# Patient Record
Sex: FEMALE | Race: WHITE | Employment: OTHER
[De-identification: names, ages, dates, MRNs, and addresses within clinical notes are randomized per-mention and may not be internally consistent; named-entity substitution may affect disease eponyms.]

---

## 2017-04-03 PROBLEM — R06.02 SOB (SHORTNESS OF BREATH) ON EXERTION: Status: ACTIVE | Noted: 2017-04-03

## 2017-10-16 PROBLEM — R19.7 DIARRHEA: Status: ACTIVE | Noted: 2017-10-16

## 2018-04-16 PROBLEM — R19.7 DIARRHEA: Status: RESOLVED | Noted: 2017-10-16 | Resolved: 2018-04-16

## 2018-04-16 PROBLEM — K59.1 FUNCTIONAL DIARRHEA: Status: ACTIVE | Noted: 2018-04-16

## 2018-06-12 PROBLEM — K57.30 DIVERTICULOSIS OF LARGE INTESTINE WITHOUT HEMORRHAGE: Status: ACTIVE | Noted: 2018-06-12

## 2019-07-22 PROBLEM — R31.9 HEMATURIA: Status: ACTIVE | Noted: 2019-07-22

## 2019-10-29 PROBLEM — T45.1X5A NEUROPATHY DUE TO CHEMOTHERAPEUTIC DRUG (HCC): Status: ACTIVE | Noted: 2019-10-29

## 2019-10-29 PROBLEM — G62.0 NEUROPATHY DUE TO CHEMOTHERAPEUTIC DRUG (HCC): Status: ACTIVE | Noted: 2019-10-29

## 2019-10-29 PROBLEM — Z86.73 HISTORY OF CVA (CEREBROVASCULAR ACCIDENT): Status: ACTIVE | Noted: 2019-10-29

## 2020-01-01 ENCOUNTER — HOME CARE VISIT (OUTPATIENT)
Dept: SCHEDULING | Facility: HOME HEALTH | Age: 80
End: 2020-01-01
Payer: MEDICARE

## 2020-01-01 ENCOUNTER — HOME CARE VISIT (OUTPATIENT)
Dept: HOSPICE | Facility: HOSPICE | Age: 80
End: 2020-01-01
Payer: MEDICARE

## 2020-01-01 ENCOUNTER — HOSPICE ADMISSION (OUTPATIENT)
Dept: HOSPICE | Facility: HOSPICE | Age: 80
End: 2020-01-01
Payer: MEDICARE

## 2020-01-01 ENCOUNTER — HOSPITAL ENCOUNTER (INPATIENT)
Age: 80
LOS: 2 days | End: 2020-12-14
Attending: INTERNAL MEDICINE | Admitting: INTERNAL MEDICINE
Payer: MEDICARE

## 2020-01-01 VITALS — HEART RATE: 80 BPM | RESPIRATION RATE: 20 BRPM | DIASTOLIC BLOOD PRESSURE: 100 MMHG | SYSTOLIC BLOOD PRESSURE: 150 MMHG

## 2020-01-01 VITALS
TEMPERATURE: 96.7 F | RESPIRATION RATE: 16 BRPM | HEART RATE: 84 BPM | SYSTOLIC BLOOD PRESSURE: 145 MMHG | DIASTOLIC BLOOD PRESSURE: 80 MMHG

## 2020-01-01 VITALS
DIASTOLIC BLOOD PRESSURE: 79 MMHG | TEMPERATURE: 97.7 F | RESPIRATION RATE: 16 BRPM | HEART RATE: 106 BPM | SYSTOLIC BLOOD PRESSURE: 101 MMHG

## 2020-01-01 VITALS
RESPIRATION RATE: 16 BRPM | TEMPERATURE: 98.3 F | HEART RATE: 80 BPM | DIASTOLIC BLOOD PRESSURE: 80 MMHG | SYSTOLIC BLOOD PRESSURE: 152 MMHG

## 2020-01-01 VITALS
TEMPERATURE: 98 F | HEART RATE: 110 BPM | RESPIRATION RATE: 16 BRPM | SYSTOLIC BLOOD PRESSURE: 152 MMHG | DIASTOLIC BLOOD PRESSURE: 79 MMHG

## 2020-01-01 VITALS
DIASTOLIC BLOOD PRESSURE: 61 MMHG | RESPIRATION RATE: 16 BRPM | HEART RATE: 81 BPM | SYSTOLIC BLOOD PRESSURE: 122 MMHG | TEMPERATURE: 98 F

## 2020-01-01 VITALS
HEART RATE: 84 BPM | RESPIRATION RATE: 16 BRPM | DIASTOLIC BLOOD PRESSURE: 75 MMHG | TEMPERATURE: 96.1 F | SYSTOLIC BLOOD PRESSURE: 124 MMHG

## 2020-01-01 VITALS
DIASTOLIC BLOOD PRESSURE: 78 MMHG | RESPIRATION RATE: 16 BRPM | SYSTOLIC BLOOD PRESSURE: 144 MMHG | TEMPERATURE: 97.5 F | HEART RATE: 84 BPM

## 2020-01-01 VITALS
TEMPERATURE: 97.8 F | RESPIRATION RATE: 16 BRPM | SYSTOLIC BLOOD PRESSURE: 150 MMHG | DIASTOLIC BLOOD PRESSURE: 80 MMHG | HEART RATE: 88 BPM

## 2020-01-01 VITALS
TEMPERATURE: 98.2 F | HEIGHT: 70 IN | HEART RATE: 105 BPM | WEIGHT: 170 LBS | SYSTOLIC BLOOD PRESSURE: 165 MMHG | RESPIRATION RATE: 18 BRPM | BODY MASS INDEX: 24.34 KG/M2 | OXYGEN SATURATION: 95 % | DIASTOLIC BLOOD PRESSURE: 90 MMHG

## 2020-01-01 VITALS
RESPIRATION RATE: 16 BRPM | SYSTOLIC BLOOD PRESSURE: 150 MMHG | DIASTOLIC BLOOD PRESSURE: 77 MMHG | HEART RATE: 82 BPM | TEMPERATURE: 98 F

## 2020-01-01 VITALS
TEMPERATURE: 95 F | DIASTOLIC BLOOD PRESSURE: 69 MMHG | RESPIRATION RATE: 16 BRPM | HEART RATE: 76 BPM | SYSTOLIC BLOOD PRESSURE: 129 MMHG

## 2020-01-01 VITALS — SYSTOLIC BLOOD PRESSURE: 149 MMHG | HEART RATE: 80 BPM | RESPIRATION RATE: 16 BRPM | DIASTOLIC BLOOD PRESSURE: 78 MMHG

## 2020-01-01 VITALS
RESPIRATION RATE: 18 BRPM | DIASTOLIC BLOOD PRESSURE: 90 MMHG | HEART RATE: 88 BPM | SYSTOLIC BLOOD PRESSURE: 150 MMHG | TEMPERATURE: 97.2 F

## 2020-01-01 VITALS
DIASTOLIC BLOOD PRESSURE: 80 MMHG | HEART RATE: 84 BPM | SYSTOLIC BLOOD PRESSURE: 130 MMHG | RESPIRATION RATE: 16 BRPM | TEMPERATURE: 97.7 F

## 2020-01-01 VITALS
TEMPERATURE: 98.1 F | SYSTOLIC BLOOD PRESSURE: 140 MMHG | HEART RATE: 92 BPM | DIASTOLIC BLOOD PRESSURE: 70 MMHG | RESPIRATION RATE: 16 BRPM

## 2020-01-01 VITALS
RESPIRATION RATE: 16 BRPM | SYSTOLIC BLOOD PRESSURE: 140 MMHG | TEMPERATURE: 98.2 F | HEART RATE: 88 BPM | DIASTOLIC BLOOD PRESSURE: 70 MMHG

## 2020-01-01 VITALS
TEMPERATURE: 97.5 F | SYSTOLIC BLOOD PRESSURE: 145 MMHG | DIASTOLIC BLOOD PRESSURE: 80 MMHG | HEART RATE: 88 BPM | RESPIRATION RATE: 18 BRPM

## 2020-01-01 VITALS
TEMPERATURE: 98.1 F | SYSTOLIC BLOOD PRESSURE: 98 MMHG | HEART RATE: 115 BPM | DIASTOLIC BLOOD PRESSURE: 65 MMHG | RESPIRATION RATE: 18 BRPM

## 2020-01-01 VITALS
SYSTOLIC BLOOD PRESSURE: 128 MMHG | RESPIRATION RATE: 16 BRPM | HEART RATE: 118 BPM | DIASTOLIC BLOOD PRESSURE: 73 MMHG | TEMPERATURE: 99.2 F

## 2020-01-01 VITALS
RESPIRATION RATE: 16 BRPM | TEMPERATURE: 98.1 F | HEART RATE: 89 BPM | DIASTOLIC BLOOD PRESSURE: 90 MMHG | SYSTOLIC BLOOD PRESSURE: 146 MMHG

## 2020-01-01 VITALS
DIASTOLIC BLOOD PRESSURE: 80 MMHG | TEMPERATURE: 97.8 F | HEART RATE: 88 BPM | SYSTOLIC BLOOD PRESSURE: 140 MMHG | RESPIRATION RATE: 18 BRPM

## 2020-01-01 VITALS
TEMPERATURE: 97.9 F | HEART RATE: 110 BPM | RESPIRATION RATE: 18 BRPM | SYSTOLIC BLOOD PRESSURE: 104 MMHG | DIASTOLIC BLOOD PRESSURE: 62 MMHG

## 2020-01-01 VITALS — SYSTOLIC BLOOD PRESSURE: 140 MMHG | DIASTOLIC BLOOD PRESSURE: 90 MMHG | RESPIRATION RATE: 20 BRPM | HEART RATE: 80 BPM

## 2020-01-01 VITALS
SYSTOLIC BLOOD PRESSURE: 110 MMHG | TEMPERATURE: 97.5 F | DIASTOLIC BLOOD PRESSURE: 70 MMHG | RESPIRATION RATE: 12 BRPM | HEART RATE: 84 BPM

## 2020-01-01 VITALS
HEART RATE: 90 BPM | TEMPERATURE: 98.2 F | SYSTOLIC BLOOD PRESSURE: 139 MMHG | RESPIRATION RATE: 18 BRPM | DIASTOLIC BLOOD PRESSURE: 87 MMHG

## 2020-01-01 PROCEDURE — 0651 HSPC ROUTINE HOME CARE

## 2020-01-01 PROCEDURE — A6250 SKIN SEAL PROTECT MOISTURIZR: HCPCS

## 2020-01-01 PROCEDURE — G0299 HHS/HOSPICE OF RN EA 15 MIN: HCPCS

## 2020-01-01 PROCEDURE — G0156 HHCP-SVS OF AIDE,EA 15 MIN: HCPCS

## 2020-01-01 PROCEDURE — HOSPICE MEDICATION HC HH HOSPICE MEDICATION

## 2020-01-01 PROCEDURE — G0155 HHCP-SVS OF CSW,EA 15 MIN: HCPCS

## 2020-01-01 PROCEDURE — HOSPICE MEDICATION 0636 HC HH HOSPICE MEDICATION

## 2020-01-01 PROCEDURE — 0655 HSPC INPATIENT RESPITE

## 2020-01-01 PROCEDURE — 74011250636 HC RX REV CODE- 250/636: Performed by: NURSE PRACTITIONER

## 2020-01-01 PROCEDURE — 3331090004 HSPC SERVICE INTENSITY ADD-ON

## 2020-01-01 PROCEDURE — A6212 FOAM DRG <=16 SQ IN W/BORDER: HCPCS

## 2020-01-01 PROCEDURE — 74011250637 HC RX REV CODE- 250/637: Performed by: NURSE PRACTITIONER

## 2020-01-01 PROCEDURE — 74011000250 HC RX REV CODE- 250: Performed by: NURSE PRACTITIONER

## 2020-01-01 PROCEDURE — A9270 NON-COVERED ITEM OR SERVICE: HCPCS

## 2020-01-01 PROCEDURE — T4541 LARGE DISPOSABLE UNDERPAD: HCPCS

## 2020-01-01 PROCEDURE — 3336500001 HSPC ELECTION

## 2020-01-01 PROCEDURE — T4527 ADULT SIZE PULL-ON LG: HCPCS

## 2020-01-01 RX ORDER — SENNOSIDES 8.6 MG/1
1 TABLET ORAL DAILY
Status: DISCONTINUED | OUTPATIENT
Start: 2020-01-01 | End: 2020-01-01 | Stop reason: HOSPADM

## 2020-01-01 RX ORDER — HYDROCODONE BITARTRATE AND HOMATROPINE METHYLBROMIDE ORAL SOLUTION 5; 1.5 MG/5ML; MG/5ML
5 LIQUID ORAL
COMMUNITY
Start: 2020-01-01 | End: 2020-01-01

## 2020-01-01 RX ORDER — ALBUTEROL SULFATE 0.83 MG/ML
2.5 SOLUTION RESPIRATORY (INHALATION)
Status: DISCONTINUED | OUTPATIENT
Start: 2020-01-01 | End: 2020-01-01 | Stop reason: HOSPADM

## 2020-01-01 RX ORDER — LORAZEPAM 1 MG/1
1 TABLET ORAL
Status: DISCONTINUED | OUTPATIENT
Start: 2020-01-01 | End: 2020-01-01 | Stop reason: HOSPADM

## 2020-01-01 RX ORDER — HALOPERIDOL 2 MG/ML
2 SOLUTION ORAL
Status: DISCONTINUED | OUTPATIENT
Start: 2020-01-01 | End: 2020-01-01 | Stop reason: HOSPADM

## 2020-01-01 RX ORDER — BENZONATATE 100 MG/1
100 CAPSULE ORAL
Status: DISCONTINUED | OUTPATIENT
Start: 2020-01-01 | End: 2020-01-01 | Stop reason: HOSPADM

## 2020-01-01 RX ORDER — HYDROXYZINE HYDROCHLORIDE 10 MG/1
10 TABLET, FILM COATED ORAL
Status: DISCONTINUED | OUTPATIENT
Start: 2020-01-01 | End: 2020-01-01 | Stop reason: HOSPADM

## 2020-01-01 RX ORDER — DEXAMETHASONE 4 MG/1
2 TABLET ORAL 2 TIMES DAILY WITH MEALS
Status: DISCONTINUED | OUTPATIENT
Start: 2020-01-01 | End: 2020-01-01 | Stop reason: HOSPADM

## 2020-01-01 RX ORDER — HYOSCYAMINE SULFATE 0.12 MG/1
0.12 TABLET SUBLINGUAL
Status: DISCONTINUED | OUTPATIENT
Start: 2020-01-01 | End: 2020-01-01 | Stop reason: HOSPADM

## 2020-01-01 RX ORDER — FENTANYL 12.5 UG/1
1 PATCH TRANSDERMAL
Status: DISCONTINUED | OUTPATIENT
Start: 2020-01-01 | End: 2020-01-01 | Stop reason: HOSPADM

## 2020-01-01 RX ORDER — ACETAMINOPHEN 650 MG/1
650 SUPPOSITORY RECTAL
Status: DISCONTINUED | OUTPATIENT
Start: 2020-01-01 | End: 2020-01-01 | Stop reason: HOSPADM

## 2020-01-01 RX ORDER — FACIAL-BODY WIPES
10 EACH TOPICAL AS NEEDED
Status: DISCONTINUED | OUTPATIENT
Start: 2020-01-01 | End: 2020-01-01 | Stop reason: HOSPADM

## 2020-01-01 RX ADMIN — Medication 20 MG: at 00:47

## 2020-01-01 RX ADMIN — Medication 10 MG: at 20:22

## 2020-01-01 RX ADMIN — DEXAMETHASONE 2 MG: 4 TABLET ORAL at 08:50

## 2020-01-01 RX ADMIN — Medication 20 MG: at 05:53

## 2020-01-01 RX ADMIN — DEXAMETHASONE 2 MG: 4 TABLET ORAL at 18:14

## 2020-01-01 RX ADMIN — DEXAMETHASONE 2 MG: 4 TABLET ORAL at 17:27

## 2020-01-01 RX ADMIN — SENNOSIDES 8.6 MG: 8.6 TABLET, FILM COATED ORAL at 08:50

## 2020-01-01 RX ADMIN — Medication 20 MG: at 21:19

## 2020-07-20 PROBLEM — C34.91 BRONCHOGENIC LUNG CANCER, RIGHT (HCC): Status: ACTIVE | Noted: 2019-05-01

## 2020-07-20 PROBLEM — I63.9 CEREBROVASCULAR ACCIDENT (HCC): Status: ACTIVE | Noted: 2019-03-14

## 2020-07-20 PROBLEM — R05.9 COUGH: Status: ACTIVE | Noted: 2020-01-01

## 2020-07-20 PROBLEM — I69.30 HISTORY OF STROKE WITH CURRENT RESIDUAL EFFECTS: Status: ACTIVE | Noted: 2020-01-01

## 2020-07-20 PROBLEM — E87.1 HYPONATREMIA: Status: ACTIVE | Noted: 2019-03-14

## 2020-07-20 PROBLEM — Z85.3 HISTORY OF RIGHT BREAST CANCER: Status: ACTIVE | Noted: 2018-09-07

## 2020-07-20 PROBLEM — Z95.828 PORT-A-CATH IN PLACE: Status: ACTIVE | Noted: 2017-06-28

## 2020-07-20 PROBLEM — E83.42 HYPOMAGNESEMIA: Status: ACTIVE | Noted: 2020-01-01

## 2020-07-20 PROBLEM — R82.90 ABNORMAL URINALYSIS: Status: ACTIVE | Noted: 2019-03-14

## 2020-07-20 PROBLEM — E87.6 HYPOKALEMIA: Status: ACTIVE | Noted: 2019-03-14

## 2020-09-09 PROBLEM — Z51.5 HOSPICE CARE: Status: ACTIVE | Noted: 2020-01-01

## 2020-11-28 NOTE — PROGRESS NOTES
Due to yesterday being a holiday (Thanksgiving), the family did not think it was necessary for me to come out today. Visit cancelled for today.

## 2020-12-02 PROBLEM — Z51.5 HOSPICE CARE PATIENT: Status: ACTIVE | Noted: 2020-01-01

## 2020-12-12 PROBLEM — C34.11 PRIMARY ADENOCARCINOMA OF UPPER LOBE OF RIGHT LUNG (HCC): Status: ACTIVE | Noted: 2020-01-01

## 2020-12-12 NOTE — PROGRESS NOTES
Pt admitted for respite stay, as family thinks the pt is nearing the end of her life. Pt arrived via ems on 4 liters of oxygen. Pt able to answer some questions. Pt has port to right chest that is not accessed. Pt lungs coarse and diminished. Pt flat and non-distended with active bowel sounds. Pt rios has some sediment but is yellow. Pt has several bruises and 3 various stages of pressure ulcers, see flow sheets. Pt states that she is currently not in and pain or distress. 1610 Pt observed and appears to be sleeping/resting, no facial grimacing, no moaning, easy relaxed respirations. No symptoms to manage at this time. Flacc 0/10.    1730 Pt alert and eat with the assistance of the cna. Po medications given but pt did have a hard time with pills so they were crushed. Fentanyl patch placed to left chest. Pt denies pain.

## 2020-12-13 NOTE — PROGRESS NOTES
Problem: Falls - Risk of  Goal: *Absence of Falls  Description: Document Carmen Serrano Fall Risk and appropriate interventions in the flowsheet. Outcome: Progressing Towards Goal  Note: Fall Risk Interventions:       Mentation Interventions: Adequate sleep, hydration, pain control, Bed/chair exit alarm, Door open when patient unattended, Evaluate medications/consider consulting pharmacy, Familiar objects from home, Reorient patient, Room close to nurse's station, Update white board    Medication Interventions: Bed/chair exit alarm    Elimination Interventions: Bed/chair exit alarm, Call light in reach              Problem: Pressure Injury - Risk of  Goal: *Prevention of pressure injury  Description: Document Xavier Scale and appropriate interventions in the flowsheet. Outcome: Progressing Towards Goal  Note: Pressure Injury Interventions:  Sensory Interventions: Assess changes in LOC, Float heels, Minimize linen layers, Pressure redistribution bed/mattress (bed type), Turn and reposition approx. every two hours (pillows and wedges if needed), Check visual cues for pain, Keep linens dry and wrinkle-free         Activity Interventions: Pressure redistribution bed/mattress(bed type)    Mobility Interventions: Pressure redistribution bed/mattress (bed type), PT/OT evaluation, Turn and reposition approx.  every two hours(pillow and wedges), Float heels    Nutrition Interventions: Document food/fluid/supplement intake    Friction and Shear Interventions: Apply protective barrier, creams and emollients, Lift sheet, Foam dressings/transparent film/skin sealants

## 2020-12-13 NOTE — PROGRESS NOTES
Bedside Report taken from Lizz Church RN. Pt identified. Pt in bed with eyes closed; displaying no signs or symptoms of pain, dyspnea, agitation,seizures, nausea, or vomiting. FLACC 0. Bed locked and low, side rails up, tabs/bed alarm in place for pt. safety. Call light with in reach, and door opened for continued monitoring. Fentanyl patch visualized with Previous shift nurse patch intact. Care plan reviewed with [address].     4922 Pt admitted respite. Pt easy to arouse but sleeps majority of the day. Daughter update and I verified with her her mother information being that no one came or called yesterday at admission. Pt po medication given. Pt denies pain 0/10.    1045 Pt observed and appears to be sleeping/resting, no facial grimacing, no moaning, easy relaxed respirations. No symptoms to manage at this time. Flacc 0/10.    1320 Pt observed and appears to be sleeping/resting, no facial grimacing, no moaning, easy relaxed respirations. No symptoms to manage at this time. Flacc 0/10.    1520 Pt observed and appears to be sleeping/resting, no facial grimacing, no moaning, easy relaxed respirations. No symptoms to manage at this time. Flacc 0/10    1720 Just after dinner. Pt observed and appears to be sleeping/resting, no facial grimacing, no moaning, easy relaxed respirations. No symptoms to manage at this time. Flacc 0/10    1810 Pt has been restful all shift she has awaken but conversation is at minimum. Pt has eaten bites and sips during all three meals. Po medication given. Report to be given to margie delatorre. Pt  Denies pain.

## 2020-12-13 NOTE — PROGRESS NOTES
190 Report received from [address] with walking rounds. Patient identified by name and . Patient resting quietly in bed with eyes closed. No signs or symptoms of distress noted at present. Fentanyl patch confirmed to left chest.  Bed in low and locked position, side rails up x2, call bell within reach, tab alarm in place. No family present at bedside. Door open for safety.  Patient resting quietly with her eyes closed, wakes to voice. When awake she shifts in bed as if uncomfortable. Answers yes to \"Are you hurting? \" but unable to rate pain. RN gave PRN dose of oxycodone 10 mg PO for pain prior to bedbath. Offered her some water but she is too short of breath to suck through a straw. RN used syringe to give her a couple sips of water. Patient becomes short of breath during bath and has pain in her back and sacrum, moans and grimaces despite oxycodone. Orders received to increase dose and frequency of oxycodone.  Bedbath complete, again patient resting with her eyes closed but when woken she begins shifting and grimacing. Gave PRN dose of oxycodone 20 mg PO.       Medication effective, patient resting quietly in bed with eyes closed, respirations even and unlabored. FLACC 0/10, no signs of distress or discomfort. Lights dimmed to promote rest.  Door open for patient's safety. 2332 Patient resting quietly in bed with eyes closed, respirations even and unlabored. FLACC 0/10, no signs of distress or discomfort. Door open for patient's safety. 5333 Patient grimacing in pain when woken for vital signs. She tells staff that her back is hurting. RN asked if she needs something for pain, she responds \"Yes I think I need something. \"  Gave PRN dose of oxycodone 20 mg PO for pain. See flowsheets for additional patient observation/rounds. Report given to Silvano Young RN.

## 2020-12-14 NOTE — PROGRESS NOTES
Writer entered room, no chest rise and fall noted, and pt is pale without movement. No lung or heart sounds auscultated. Pupils are dilated and fixed. Death time 56. Primary nurse Benjamin Be notified.

## 2020-12-14 NOTE — PROGRESS NOTES
1900 Report received from [address] with walking rounds. Patient identified by name and . Patient resting quietly in bed with eyes closed. No signs or symptoms of distress noted at present. Fentanyl patch confirmed to left chest.  Bed in low and locked position, side rails up x2, call bell within reach, tab alarm in place. No family present at bedside. Door open for safety. 2218 Patient remains asleep, resting quietly in bed with eyes closed, respirations even and unlabored. FLACC 0/10, no signs of distress or discomfort. Door open for patient's safety. 7785 Patient is shifting her shoulders and arms, grimacing expression on her face. She opens her eyes to touch but does not answer if she is hurting. Gave PRN dose of oxycodone 20 mg PO for pain. 0113 Medication effective, patient resting quietly in bed with eyes closed, respirations even and unlabored. FLACC 0/10, no signs of distress or discomfort. Door open for patient's safety. 7575 Patient without pulse or respirations per Divinajesus Boyd. This RN was with another patient at the time. 8238 Notified patient's daughter Luis Linder of her mother's death. She voiced relief and says \"She was ready to go. \"  RN offered to bag up her pillows, gown etc but Luis Linder instructs RN that she does not want any belongings that were sent with her, she would like [address] to dispose of them. Offered condolences. 3505 Body released to [facility] with [facility].
